# Patient Record
Sex: FEMALE | URBAN - METROPOLITAN AREA
[De-identification: names, ages, dates, MRNs, and addresses within clinical notes are randomized per-mention and may not be internally consistent; named-entity substitution may affect disease eponyms.]

---

## 2023-12-26 ENCOUNTER — APPOINTMENT (RX ONLY)
Dept: URBAN - METROPOLITAN AREA CLINIC 159 | Facility: CLINIC | Age: 63
Setting detail: DERMATOLOGY
End: 2023-12-26

## 2023-12-26 DIAGNOSIS — L50.1 IDIOPATHIC URTICARIA: ICD-10-CM | Status: INADEQUATELY CONTROLLED

## 2023-12-26 PROCEDURE — ? PRESCRIPTION MEDICATION MANAGEMENT

## 2023-12-26 PROCEDURE — 99204 OFFICE O/P NEW MOD 45 MIN: CPT

## 2023-12-26 PROCEDURE — ? COUNSELING

## 2023-12-26 ASSESSMENT — LOCATION SIMPLE DESCRIPTION DERM
LOCATION SIMPLE: LEFT FOREARM
LOCATION SIMPLE: RIGHT FOREARM
LOCATION SIMPLE: LEFT POSTERIOR THIGH

## 2023-12-26 ASSESSMENT — LOCATION DETAILED DESCRIPTION DERM
LOCATION DETAILED: LEFT PROXIMAL DORSAL FOREARM
LOCATION DETAILED: RIGHT PROXIMAL DORSAL FOREARM
LOCATION DETAILED: LEFT DISTAL POSTERIOR THIGH

## 2023-12-26 ASSESSMENT — LOCATION ZONE DERM
LOCATION ZONE: ARM
LOCATION ZONE: LEG

## 2023-12-26 NOTE — PROCEDURE: PRESCRIPTION MEDICATION MANAGEMENT
Discontinue Regimen: Famotidine
Continue Regimen: Hydroxizine 25mg qhs (rx from urgent care)
Render In Strict Bullet Format?: No
Plan: Education of hive reaction post virus. Not contagious.
Detail Level: Zone
Initiate Treatment: OTC Zyrtec and claritin during the day x 30 days then discontinue one at a time
Samples Given: Cerave anti itch (keep in the fridge, cooling effect when applied)

## 2023-12-26 NOTE — HPI: RASH
Is This A New Presentation, Or A Follow-Up?: Rash
Additional History: Since having covid early December, pt present with itchy rash on the b/l extremities and left post thigh.  Comes and goes on other areas on the body. [facility] Finishing ivermectin for covid, famotidine 20mg, hydroxizine 25mg. Pt is using topical benedryl.